# Patient Record
Sex: FEMALE | Race: WHITE | NOT HISPANIC OR LATINO | URBAN - METROPOLITAN AREA
[De-identification: names, ages, dates, MRNs, and addresses within clinical notes are randomized per-mention and may not be internally consistent; named-entity substitution may affect disease eponyms.]

---

## 2022-12-05 ENCOUNTER — EMERGENCY (EMERGENCY)
Facility: HOSPITAL | Age: 72
LOS: 1 days | Discharge: HOME | End: 2022-12-05
Admitting: EMERGENCY MEDICINE
Payer: MEDICARE

## 2022-12-05 VITALS
TEMPERATURE: 98 F | HEART RATE: 96 BPM | SYSTOLIC BLOOD PRESSURE: 144 MMHG | OXYGEN SATURATION: 97 % | RESPIRATION RATE: 18 BRPM | WEIGHT: 158.51 LBS | DIASTOLIC BLOOD PRESSURE: 87 MMHG

## 2022-12-05 PROCEDURE — 96372 THER/PROPH/DIAG INJ SC/IM: CPT

## 2022-12-05 PROCEDURE — 99284 EMERGENCY DEPT VISIT MOD MDM: CPT | Mod: 25

## 2022-12-05 PROCEDURE — 72100 X-RAY EXAM L-S SPINE 2/3 VWS: CPT | Mod: 26

## 2022-12-05 PROCEDURE — 72100 X-RAY EXAM L-S SPINE 2/3 VWS: CPT

## 2022-12-05 PROCEDURE — 99283 EMERGENCY DEPT VISIT LOW MDM: CPT | Mod: 25

## 2022-12-05 RX ORDER — LIDOCAINE 4 G/100G
1 CREAM TOPICAL ONCE
Refills: 0 | Status: COMPLETED | OUTPATIENT
Start: 2022-12-05 | End: 2022-12-05

## 2022-12-05 RX ORDER — METHOCARBAMOL 500 MG/1
2 TABLET, FILM COATED ORAL
Qty: 18 | Refills: 0
Start: 2022-12-05 | End: 2022-12-07

## 2022-12-05 RX ORDER — KETOROLAC TROMETHAMINE 30 MG/ML
30 SYRINGE (ML) INJECTION ONCE
Refills: 0 | Status: DISCONTINUED | OUTPATIENT
Start: 2022-12-05 | End: 2022-12-05

## 2022-12-05 RX ADMIN — LIDOCAINE 1 PATCH: 4 CREAM TOPICAL at 10:47

## 2022-12-05 RX ADMIN — Medication 30 MILLIGRAM(S): at 10:52

## 2022-12-05 NOTE — ED PROVIDER NOTE - OBJECTIVE STATEMENT
73 yo F with pmh of hypothyroidism c/o low back pain x 2 weeks. Pt states she woke up with pain 2 weeks ago. Denies trauma. Pain worse with getting up, bending and changing position. Pt having trouble vacuuming, walking and doing her daily activities Pain improved with rest. Pt has been taking alleve with relief (last dose last nt). Pain non radiating. Denies fever, chills, abd pain, hematuria, dysuria, incontinence, numbness, tingling, wt loss. Pt has appt w/pcp thurs but was told to come to the ED to be evaluated if she could not wait.

## 2022-12-05 NOTE — ED PROVIDER NOTE - PATIENT PORTAL LINK FT
You can access the FollowMyHealth Patient Portal offered by [facility] by registering at the following website: [email or web address]. By joining [facility]’s FollowMyHealth portal, you will also be able to view your health information using other applications (apps) compatible with our system.

## 2022-12-05 NOTE — ED ADULT NURSE NOTE - OBJECTIVE STATEMENT
72 y.o F a&ox4 walked in w/ steady gait c.o back pain. x 1 week of lower back pain, pain worse when going from a sitting to standing position. no nbu 72 y.o F a&ox4 walked in w/ steady gait c.o back pain. x 1 week of lower back pain, pain worse when going from a sitting to standing position. no numbness/ tingling/ weakness. no falls. did not lift heavy objects.

## 2022-12-05 NOTE — ED PROVIDER NOTE - PHYSICAL EXAMINATION
CONSTITUTIONAL: Well-appearing; well-nourished; in no apparent distress.   HEAD: Normocephalic; atraumatic.   NECK: Supple; non-tender;   CARDIOVASCULAR: Normal S1, S2; no murmurs, rubs, or gallops. Regular rate and rhythm.   RESPIRATORY: Breathing easily; breath sounds clear and equal bilaterally; no wheezes, rhonchi, or rales.  MSK: FROM at all extremities,   Back: no tenderness to back on palpation, reproduced with bending and twisting, neg SLR   Neuro: CN 2-12 intact, normal finger to nose, normal gait, strength normal

## 2022-12-05 NOTE — ED PROVIDER NOTE - CLINICAL SUMMARY MEDICAL DECISION MAKING FREE TEXT BOX
71 yo F with pmh of hypothyroidism c/o low back pain x 2 weeks. Pt states she woke up with pain 2 weeks ago. Denies trauma. Pain worse with getting up, bending and changing position. Pt having trouble vacuuming, walking and doing her daily activities Pain improved with rest.  Denies fever, chills, abd pain, hematuria, dysuria, incontinence, numbness, tingling, wt loss. no tenderness to back on palpation, reproduced with bending and twisting, neg SLR 71 yo F with pmh of hypothyroidism c/o low back pain x 2 weeks. Pt states she woke up with pain 2 weeks ago. Denies trauma. Pain worse with getting up, bending and changing position. Pt having trouble vacuuming, walking and doing her daily activities Pain improved with rest.  Denies fever, chills, abd pain, hematuria, dysuria, incontinence, numbness, tingling, wt loss. no tenderness to back on palpation, reproduced with bending and twisting, neg SLR. XR neg. Pt given toradol and lido patch, will send robaxin to pharmacy. Pt has f/u on thurs

## 2022-12-05 NOTE — ED ADULT TRIAGE NOTE - CHIEF COMPLAINT QUOTE
Pt sent in by MD c/o lower back pain x weeks. Pt states "I have an appointment with my doctor this week but he sent me here for a shot of pain medication until then." Ambulatory with steady gait. Denies bowel/bladder incontinence, numbness/tingling.

## 2022-12-05 NOTE — ED PROVIDER NOTE - NSFOLLOWUPINSTRUCTIONS_ED_ALL_ED_FT
Back Pain    Back pain is very common in adults. The cause of back pain is rarely dangerous and the pain often gets better over time. The cause of your back pain may not be known and may include strain of muscles or ligaments, degeneration of the spinal disks, or arthritis. Occasionally the pain may radiate down your leg(s). Over-the-counter medicines to reduce pain and inflammation are often the most helpful. Stretching and remaining active frequently helps the healing process.     SEEK IMMEDIATE MEDICAL CARE IF YOU HAVE ANY OF THE FOLLOWING SYMPTOMS: bowel or bladder control problems, unusual weakness or numbness in your arms or legs, nausea or vomiting, abdominal pain, fever, dizziness/lightheadedness. Your XR shows that you have a compression fracture in your T12 vertebrae this may have happened a while ago or more recently. Please follow up with your primary care doctor. Return to ED for worsening pain, numbness, tingling, incontinence.     Back Pain    Back pain is very common in adults. The cause of back pain is rarely dangerous and the pain often gets better over time. The cause of your back pain may not be known and may include strain of muscles or ligaments, degeneration of the spinal disks, or arthritis. Occasionally the pain may radiate down your leg(s). Over-the-counter medicines to reduce pain and inflammation are often the most helpful. Stretching and remaining active frequently helps the healing process.     SEEK IMMEDIATE MEDICAL CARE IF YOU HAVE ANY OF THE FOLLOWING SYMPTOMS: bowel or bladder control problems, unusual weakness or numbness in your arms or legs, nausea or vomiting, abdominal pain, fever, dizziness/lightheadedness.

## 2022-12-06 DIAGNOSIS — M54.50 LOW BACK PAIN, UNSPECIFIED: ICD-10-CM

## 2022-12-06 DIAGNOSIS — E03.9 HYPOTHYROIDISM, UNSPECIFIED: ICD-10-CM
